# Patient Record
Sex: MALE | Race: WHITE | URBAN - METROPOLITAN AREA
[De-identification: names, ages, dates, MRNs, and addresses within clinical notes are randomized per-mention and may not be internally consistent; named-entity substitution may affect disease eponyms.]

---

## 2022-10-13 ENCOUNTER — OFFICE VISIT (OUTPATIENT)
Dept: URBAN - METROPOLITAN AREA CLINIC 37 | Facility: CLINIC | Age: 72
End: 2022-10-13
Payer: MEDICARE

## 2022-10-13 DIAGNOSIS — H43.813 VITREOUS DEGENERATION, BILATERAL: ICD-10-CM

## 2022-10-13 DIAGNOSIS — H25.813 COMBINED FORMS OF AGE-RELATED CATARACT, BILATERAL: ICD-10-CM

## 2022-10-13 DIAGNOSIS — H40.1132 PRIMARY OPEN-ANGLE GLAUCOMA, BILATERAL, MODERATE STAGE: Primary | ICD-10-CM

## 2022-10-13 DIAGNOSIS — H40.1432 BIALTERAL CAPSULAR GLAUCOMA W/ PSEUDOEXFOLIATION OF LENS, MODERATE STAGE: ICD-10-CM

## 2022-10-13 PROCEDURE — 92133 CPTRZD OPH DX IMG PST SGM ON: CPT | Performed by: OPTOMETRIST

## 2022-10-13 PROCEDURE — 99204 OFFICE O/P NEW MOD 45 MIN: CPT | Performed by: OPTOMETRIST

## 2022-10-13 ASSESSMENT — VISUAL ACUITY
OD: 20/30
OS: 20/50

## 2022-10-13 ASSESSMENT — INTRAOCULAR PRESSURE
OS: 14
OD: 14

## 2022-10-13 NOTE — IMPRESSION/PLAN
Impression: Primary open-angle glaucoma, bilateral, moderate stage: F53.3090.  Plan: cont w gtts as directed by DR Radha Gaming

## 2022-10-13 NOTE — IMPRESSION/PLAN
Impression: Combined forms of age-related cataract, bilateral: H25.813. Plan:  Discussed cataract diagnosis with the patient. Discussed and reviewed treatment options for cataracts. Surgical treatment is required for cataracts. Recommend surgery OU, OD first. 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
PT interested in [address] [place] [address] as well PT Lulú Del Rosario MD and his Dustin Erickson MD [facility] recd DR Urban Garcia

## 2022-12-28 ENCOUNTER — TESTING ONLY (OUTPATIENT)
Dept: URBAN - METROPOLITAN AREA CLINIC 10 | Facility: CLINIC | Age: 72
End: 2022-12-28
Payer: MEDICARE

## 2022-12-28 DIAGNOSIS — H25.813 COMBINED FORMS OF AGE-RELATED CATARACT, BILATERAL: Primary | ICD-10-CM

## 2022-12-28 DIAGNOSIS — Z01.818 ENCOUNTER FOR OTHER PREPROCEDURAL EXAMINATION: Primary | ICD-10-CM

## 2022-12-28 PROCEDURE — 92025 CPTRIZED CORNEAL TOPOGRAPHY: CPT | Performed by: OPHTHALMOLOGY

## 2022-12-28 PROCEDURE — 99202 OFFICE O/P NEW SF 15 MIN: CPT | Performed by: PHYSICIAN ASSISTANT

## 2022-12-29 ENCOUNTER — PRE-OPERATIVE VISIT (OUTPATIENT)
Dept: URBAN - METROPOLITAN AREA CLINIC 10 | Facility: CLINIC | Age: 72
End: 2022-12-29
Payer: MEDICARE

## 2022-12-29 DIAGNOSIS — H52.223 REGULAR ASTIGMATISM, BILATERAL: ICD-10-CM

## 2022-12-29 PROCEDURE — 99214 OFFICE O/P EST MOD 30 MIN: CPT | Performed by: OPHTHALMOLOGY

## 2022-12-29 ASSESSMENT — PACHYMETRY
OS: 24.33
OS: 3.34
OD: 3.30
OD: 24.36

## 2022-12-29 ASSESSMENT — INTRAOCULAR PRESSURE
OS: 13
OD: 13

## 2022-12-29 NOTE — IMPRESSION/PLAN
Impression: Combined forms of age-related cataract, bilateral: H25.813. Plan: Discussed cataract diagnosis with the patient. Discussed and reviewed treatment options for cataracts. Surgical treatment is required for cataracts. Risks and benefits of surgical treatment were discussed and understood. Patient elects surgical treatment. Patient understands the need for glasses post-op. Recommend surgery OU,OD  first. STD LENS DISTANCE AIM , ORA OD ONLY AFTER SURGEON DISCUSSION, PLAN LRI, REVIEWED RIC. Discussed limitations to vision post op due to POAG, PSEDUEXFOLIOATION, VIT DEGEN,  If first eye doing well, ok to proceed with second eye surgery. [facility] [address] [facility] WILL HAVE FORCED MONOVISION TRIAL BETWEEN EYES, WILL CONFIRM AIM AT 1 WEEK PO.

## 2023-01-06 ENCOUNTER — OFFICE VISIT (OUTPATIENT)
Dept: URBAN - METROPOLITAN AREA CLINIC 10 | Facility: CLINIC | Age: 73
End: 2023-01-06
Payer: MEDICARE

## 2023-01-06 DIAGNOSIS — H59.021: ICD-10-CM

## 2023-01-06 DIAGNOSIS — H27.01 APHAKIA, RIGHT EYE: ICD-10-CM

## 2023-01-06 DIAGNOSIS — H25.813 COMBINED FORMS OF AGE-RELATED CATARACT, BILATERAL: Primary | ICD-10-CM

## 2023-01-06 PROCEDURE — 92014 COMPRE OPH EXAM EST PT 1/>: CPT | Performed by: OPHTHALMOLOGY

## 2023-01-06 PROCEDURE — 92134 CPTRZ OPH DX IMG PST SGM RTA: CPT | Performed by: OPHTHALMOLOGY

## 2023-01-06 ASSESSMENT — INTRAOCULAR PRESSURE
OD: 16
OS: 12

## 2023-01-06 NOTE — IMPRESSION/PLAN
Impression: Lens fragments in eye following cataract surgery of right eye: H59.021. Plan: The patient is surgically aphakic OD,  The findings were discussed with the patient, along with the risks and benefits of a vitrectomy/ secondary sutured IOL OD/ removal of lens material and the patient will let us know if they would like to be scheduled for surgery.

## 2023-01-14 ENCOUNTER — POST-OPERATIVE VISIT (OUTPATIENT)
Dept: URBAN - METROPOLITAN AREA CLINIC 10 | Facility: CLINIC | Age: 73
End: 2023-01-14
Payer: MEDICARE

## 2023-01-14 DIAGNOSIS — Z48.810 ENCOUNTER FOR SURGICAL AFTERCARE FOLLOWING SURGERY ON A SENSE ORGAN: Primary | ICD-10-CM

## 2023-01-14 PROCEDURE — 99024 POSTOP FOLLOW-UP VISIT: CPT

## 2023-01-14 RX ORDER — OFLOXACIN 3 MG/ML
0.3 % SOLUTION/ DROPS OPHTHALMIC
Qty: 5 | Refills: 1 | Status: ACTIVE
Start: 2023-01-14

## 2023-01-14 NOTE — IMPRESSION/PLAN
Impression: S/P Posterior Vitrectomy (PPVIT)/secondary sutured IOL OD - 1 Day. Encounter for surgical aftercare following surgery on a sense organ  Z48.810. Post operative instructions reviewed - Plan: Reviewed post op gtts. Patient has large corneal abrasion superior OD. Offered bandage CL and pt declined. Increase use of Ofloxacin to Q2-3H OD x 1 week. Pt to call if pain increases or any visual changes.

## 2023-01-27 ENCOUNTER — OFFICE VISIT (OUTPATIENT)
Dept: URBAN - METROPOLITAN AREA CLINIC 10 | Facility: CLINIC | Age: 73
End: 2023-01-27

## 2023-01-27 DIAGNOSIS — H59.021 CATARACT (LENS) FRAGMENTS IN EYE FOLLOWING CATARACT SURGERY, RIGHT EYE: Primary | ICD-10-CM

## 2023-01-27 PROCEDURE — 92134 CPTRZ OPH DX IMG PST SGM RTA: CPT | Performed by: OPHTHALMOLOGY

## 2023-01-27 PROCEDURE — 99024 POSTOP FOLLOW-UP VISIT: CPT | Performed by: OPHTHALMOLOGY

## 2023-01-27 ASSESSMENT — INTRAOCULAR PRESSURE
OD: 35
OS: 18

## 2023-01-27 NOTE — IMPRESSION/PLAN
Impression: Lens fragments in eye following cataract surgery of right eye: H59.021. Plan: The patient's retina is flat and attached 360 OD. The patient will stop his predforte since his IOP is elevated (continue his glaucoma drops) and continue his Ofloxicin BID and then return to clinic in one month for a dilated follow up and possible oct.

## 2023-03-03 ENCOUNTER — OFFICE VISIT (OUTPATIENT)
Dept: URBAN - METROPOLITAN AREA CLINIC 10 | Facility: CLINIC | Age: 73
End: 2023-03-03
Payer: MEDICARE

## 2023-03-03 DIAGNOSIS — H59.021 CATARACT (LENS) FRAGMENTS IN EYE FOLLOWING CATARACT SURGERY, RIGHT EYE: Primary | ICD-10-CM

## 2023-03-03 PROCEDURE — 99214 OFFICE O/P EST MOD 30 MIN: CPT | Performed by: OPHTHALMOLOGY

## 2023-03-03 PROCEDURE — 92134 CPTRZ OPH DX IMG PST SGM RTA: CPT | Performed by: OPHTHALMOLOGY

## 2023-03-03 ASSESSMENT — INTRAOCULAR PRESSURE
OD: 14
OS: 12

## 2023-03-03 NOTE — IMPRESSION/PLAN
Chief Complaint   Patient presents with   • Laceration       HPI:  Tin presents with a laceration of the right little finger   The injury occurred today at home.   Patient reports they sustained laceration while handling a ratchet strap .  Bleeding was well controlled in urgent care.    Last tetanus booster was in 2016, up-to-date    Denies compromise of range of motion of the affected finger.  Not on any major blood thinners.  No loss of sensation to affected finger.  Is right-hand dominant.  States otherwise healthy without other concern at this time.    No current facility-administered medications for this encounter.      Current Outpatient Medications   Medication Sig   • pantoprazole (PROTONIX) 40 MG tablet TAKE 1 TABLET BY MOUTH DAILY   • lisinopril (ZESTRIL) 2.5 MG tablet Take 1 tablet by mouth daily.   • metoPROLOL tartrate (LOPRESSOR) 50 MG tablet Take 0.5 tablets by mouth 2 times daily.   • Multiple Vitamins-Minerals (ICAPS AREDS 2 PO) Take 2 capsules by mouth daily.   • ibuprofen (MOTRIN) 200 MG tablet Take 800 mg by mouth every 6 hours as needed for Pain.   • Red Yeast Rice 600 MG Tab 1,200 mg daily. Takes 1 tablet every other day   • aspirin 81 MG tablet Take 1 tablet by mouth daily.   • cholecalciferol (VITAMIN D3) 1000 UNITS tablet Take 1,000 Units by mouth daily.   • nitroGLYcerin (NITROSTAT) 0.4 MG sublingual tablet Place 1 tablet under the tongue every 5 minutes as needed for Chest pain.       Social History     Tobacco Use   Smoking Status Former Smoker   • Packs/day: 0.75   • Years: 34.00   • Pack years: 25.50   • Types: Cigarettes   • Start date: 1972   • Last attempt to quit: 2006   • Years since quittin.5   Smokeless Tobacco Former User   • Types: Chew   • Quit date: 1981       ROS:  ALL 13 SYSTEMS REVIEWED AND NEGATIVE OR NONCONTRIBUTORY UNLESS OTHERWISE NOTED IN HPI    PHYSICAL EXAMINATION:   Vitals:    20 1036   BP: (!) 149/69   Pulse: (!) 56   Resp: 18   Temp: 98.2  Impression: Lens fragments in eye following cataract surgery of right eye: H59.021. Plan: The patient's retina is flat and attached 360 OD. The patient will return to clinic in 2 months for a dilated follow up and possible oct. °F (36.8 °C)     GEN: pleasant, alert, in no acute distress, no pallor  Heart: Regular rate and rhythm, no murmurs or gallops  Lungs: Good respiratory effort, lungs CTAB  SKIN: Exam of the right little finger reveals 1.3 cm full thickness laceration that is stellate and tangential to the plane of skin.  Approximately 3 mm in depth.  Hands rather soiled but very slight contamination, cleaning extensive.  The wound  does not contain debris or foreign body.  MUSCULOSKELETAL: ROM and strength of extremity distal to injury is not affected.  I am able to minimally see any tendon movement on flexion in a bloodless field, does not appear to have compromised any tendinous structure but there is compromise of adipose tissue.  Neurovascularly intact distally.  NEUROLOGIC: sensory and motor is intact to region of injury    PROCEDURE NOTE:  Laceration Repair  Date/Time: 7/9/2020 12:00 PM  Performed by: Jordy Welsh PA-C  Authorized by: Jordy Welsh PA-C     Consent:     Consent obtained:  Verbal    Consent given by:  Patient    Risks discussed:  Infection, need for additional repair, poor cosmetic result, tendon damage, vascular damage, poor wound healing and pain  Anesthesia (see MAR for exact dosages):     Anesthesia method:  Nerve block    Block needle gauge:  27 G    Block anesthetic:  Lidocaine 1% w/o epi    Block injection procedure:  Anatomic landmarks identified, introduced needle, incremental injection and anatomic landmarks palpated    Block outcome:  Anesthesia achieved  Laceration details:     Location:  Finger    Finger location:  R small finger    Length (cm):  1.3    Depth (mm):  3  Repair type:     Repair type:  Simple  Pre-procedure details:     Preparation:  Patient was prepped and draped in usual sterile fashion  Exploration:     Wound exploration: wound explored through full range of motion      Wound extent: areolar tissue violated      Wound extent: no fascia violation noted, no foreign  bodies/material noted, no nerve damage noted and no tendon damage noted      Contaminated: yes    Treatment:     Area cleansed with:  Shur-Clens, saline and soap and water    Amount of cleaning:  Extensive    Irrigation solution:  Sterile saline  Skin repair:     Repair method:  Sutures    Suture size:  5-0    Suture material:  Nylon    Suture technique:  Simple interrupted    Number of sutures:  4  Approximation:     Approximation:  Close  Post-procedure details:     Dressing:  Antibiotic ointment, sterile dressing and splint for protection    Patient tolerance of procedure:  Tolerated well, no immediate complications          Imaging:  No orders to display       A/P:  Laceration of right little finger    Typical wound care was reviewed, keep wound covered, clean and dry.  Return to ED for suture removal in 10-12 days, or earlier if signs of infection such as increasing/spreading redness, swelling, fevers, pain.   Avoid swimming, tub bathing, aggressive washing or scrubbing to that area   Oral antibiotics were prescribed.  Cefadroxil 500 mg b.i.d. x3 days with possible extension of wound towards the tendon sheath but no obvious tendon compromise.  Tetanus otherwise up-to-date.  Aluminum splint for finger placed by myself, tolerates well.  Contact PCP or urgent care for any further concerns including signs of infection, healing issues, pain or irritation.    Closure:  The patient understands that this is a provisional diagnosis. Provisional diagnosis can and do change. The diagnosis that you are discharged with today is based on the symptoms with which you presented today. If any new symptoms occur or worsen, you should seek immediate attention for re-evaluation.  Any symptoms that persist or fail to completely resolve require further evaluation by your other healthcare provider(s).         Jordy Welsh PA-C  07/09/20 [id number]

## 2023-03-10 ENCOUNTER — OFFICE VISIT (OUTPATIENT)
Dept: URBAN - METROPOLITAN AREA CLINIC 10 | Facility: CLINIC | Age: 73
End: 2023-03-10
Payer: MEDICARE

## 2023-03-10 DIAGNOSIS — H35.351 CYSTOID MACULAR DEGENERATION, RIGHT EYE: Primary | ICD-10-CM

## 2023-03-10 PROCEDURE — 99024 POSTOP FOLLOW-UP VISIT: CPT | Performed by: OPHTHALMOLOGY

## 2023-03-10 PROCEDURE — 67515 INJECT/TREAT EYE SOCKET: CPT | Performed by: OPHTHALMOLOGY

## 2023-03-10 ASSESSMENT — INTRAOCULAR PRESSURE
OS: 11
OD: 11

## 2023-03-10 NOTE — IMPRESSION/PLAN
Impression: Cystoid macular degeneration, right eye: H35.351. Plan: The exam and oct show that the patient has CME OD, the risks and benefits of a subtenon Kenalog injection was discussed with the patient and a subtenon Kenalog injection was administered without complication OD. The patient will return to clinic  as scheduled.

## 2023-04-07 ENCOUNTER — OFFICE VISIT (OUTPATIENT)
Dept: URBAN - METROPOLITAN AREA CLINIC 10 | Facility: CLINIC | Age: 73
End: 2023-04-07
Payer: MEDICARE

## 2023-04-07 DIAGNOSIS — H35.351 CYSTOID MACULAR DEGENERATION, RIGHT EYE: Primary | ICD-10-CM

## 2023-04-07 DIAGNOSIS — H25.12 AGE-RELATED NUCLEAR CATARACT, LEFT EYE: ICD-10-CM

## 2023-04-07 PROCEDURE — 92134 CPTRZ OPH DX IMG PST SGM RTA: CPT | Performed by: OPHTHALMOLOGY

## 2023-04-07 PROCEDURE — 99024 POSTOP FOLLOW-UP VISIT: CPT | Performed by: OPHTHALMOLOGY

## 2023-04-07 ASSESSMENT — INTRAOCULAR PRESSURE
OD: 12
OS: 18

## 2023-04-07 NOTE — IMPRESSION/PLAN
Impression: Age-related nuclear cataract, left eye: H25.12. Plan: The patient will obtain a cataract evaluation OS.

## 2023-04-07 NOTE — IMPRESSION/PLAN
Impression: Cystoid macular degeneration, right eye: H35.351. Plan: The exam and oct show that the patient has CME OD which is improving. The patient  will continue his predforte TID OD. The patient will return to clinic  with Dr. Bruno Del Castillo for a cataract evaluation.

## 2023-05-05 ENCOUNTER — OFFICE VISIT (OUTPATIENT)
Dept: URBAN - METROPOLITAN AREA CLINIC 10 | Facility: CLINIC | Age: 73
End: 2023-05-05
Payer: MEDICARE

## 2023-05-05 DIAGNOSIS — H35.351 CYSTOID MACULAR DEGENERATION, RIGHT EYE: Primary | ICD-10-CM

## 2023-05-05 PROCEDURE — 92134 CPTRZ OPH DX IMG PST SGM RTA: CPT | Performed by: OPHTHALMOLOGY

## 2023-05-05 PROCEDURE — 99214 OFFICE O/P EST MOD 30 MIN: CPT | Performed by: OPHTHALMOLOGY

## 2023-05-05 ASSESSMENT — INTRAOCULAR PRESSURE
OS: 14
OD: 11

## 2023-05-05 NOTE — IMPRESSION/PLAN
Impression: Cystoid macular degeneration, right eye: H35.351. Plan: The exam and oct show that the patient has CME OD which is resolved. The patient  will taper his drops once daily then stopping once bottle is finished. The patient will return to clinic  with Dr. Juan Miguel Seals for a cataract evaluation. He will return to clinic in one month for a dilated follow up and possible oct.

## 2023-08-01 ENCOUNTER — OFFICE VISIT (OUTPATIENT)
Dept: URBAN - METROPOLITAN AREA CLINIC 10 | Facility: CLINIC | Age: 73
End: 2023-08-01
Payer: MEDICARE

## 2023-08-01 DIAGNOSIS — H59.031 CYSTOID MACULAR EDEMA FOLLOWING CATARACT SURGERY, RIGHT EYE: ICD-10-CM

## 2023-08-01 DIAGNOSIS — H35.351 CYSTOID MACULAR DEGENERATION, RIGHT EYE: Primary | ICD-10-CM

## 2023-08-01 DIAGNOSIS — H25.12 AGE-RELATED NUCLEAR CATARACT, LEFT EYE: ICD-10-CM

## 2023-08-01 PROCEDURE — 92134 CPTRZ OPH DX IMG PST SGM RTA: CPT | Performed by: OPHTHALMOLOGY

## 2023-08-01 PROCEDURE — 99214 OFFICE O/P EST MOD 30 MIN: CPT | Performed by: OPHTHALMOLOGY

## 2023-08-01 ASSESSMENT — INTRAOCULAR PRESSURE
OD: 15
OS: 11

## 2023-09-19 ENCOUNTER — OFFICE VISIT (OUTPATIENT)
Dept: URBAN - METROPOLITAN AREA CLINIC 10 | Facility: CLINIC | Age: 73
End: 2023-09-19
Payer: MEDICARE

## 2023-09-19 DIAGNOSIS — H35.351 CYSTOID MACULAR DEGENERATION, RIGHT EYE: Primary | ICD-10-CM

## 2023-09-19 DIAGNOSIS — H25.12 AGE-RELATED NUCLEAR CATARACT, LEFT EYE: ICD-10-CM

## 2023-09-19 PROCEDURE — 99214 OFFICE O/P EST MOD 30 MIN: CPT | Performed by: OPHTHALMOLOGY

## 2023-09-19 PROCEDURE — 92134 CPTRZ OPH DX IMG PST SGM RTA: CPT | Performed by: OPHTHALMOLOGY

## 2023-09-19 PROCEDURE — 92250 FUNDUS PHOTOGRAPHY W/I&R: CPT | Performed by: OPHTHALMOLOGY

## 2023-09-19 ASSESSMENT — INTRAOCULAR PRESSURE
OS: 6
OD: 6

## 2023-11-07 ENCOUNTER — OFFICE VISIT (OUTPATIENT)
Dept: URBAN - METROPOLITAN AREA CLINIC 10 | Facility: CLINIC | Age: 73
End: 2023-11-07
Payer: MEDICARE

## 2023-11-07 DIAGNOSIS — H35.351 CYSTOID MACULAR DEGENERATION, RIGHT EYE: ICD-10-CM

## 2023-11-07 DIAGNOSIS — H59.031 CYSTOID MACULAR EDEMA FOLLOWING CATARACT SURGERY, RIGHT EYE: ICD-10-CM

## 2023-11-07 DIAGNOSIS — H43.813 VITREOUS DEGENERATION, BILATERAL: Primary | ICD-10-CM

## 2023-11-07 DIAGNOSIS — H25.12 AGE-RELATED NUCLEAR CATARACT, LEFT EYE: ICD-10-CM

## 2023-11-07 PROCEDURE — 92134 CPTRZ OPH DX IMG PST SGM RTA: CPT | Performed by: OPHTHALMOLOGY

## 2023-11-07 PROCEDURE — 99214 OFFICE O/P EST MOD 30 MIN: CPT | Performed by: OPHTHALMOLOGY

## 2023-11-07 ASSESSMENT — INTRAOCULAR PRESSURE
OS: 13
OD: 15

## 2024-01-16 ENCOUNTER — OFFICE VISIT (OUTPATIENT)
Dept: URBAN - METROPOLITAN AREA CLINIC 10 | Facility: CLINIC | Age: 74
End: 2024-01-16
Payer: MEDICARE

## 2024-01-16 DIAGNOSIS — H35.351 CYSTOID MACULAR DEGENERATION, RIGHT EYE: Primary | ICD-10-CM

## 2024-01-16 PROCEDURE — 92134 CPTRZ OPH DX IMG PST SGM RTA: CPT | Performed by: OPHTHALMOLOGY

## 2024-01-16 PROCEDURE — 99214 OFFICE O/P EST MOD 30 MIN: CPT | Performed by: OPHTHALMOLOGY

## 2024-01-16 ASSESSMENT — INTRAOCULAR PRESSURE
OD: 33
OS: 24

## 2024-02-15 ENCOUNTER — OFFICE VISIT (OUTPATIENT)
Dept: URBAN - METROPOLITAN AREA CLINIC 10 | Facility: CLINIC | Age: 74
End: 2024-02-15
Payer: MEDICARE

## 2024-02-15 DIAGNOSIS — H59.031 CYSTOID MACULAR EDEMA FOLLOWING CATARACT SURGERY, RIGHT EYE: Primary | ICD-10-CM

## 2024-02-15 PROCEDURE — 99214 OFFICE O/P EST MOD 30 MIN: CPT | Performed by: OPHTHALMOLOGY

## 2024-02-15 RX ORDER — DICLOFENAC SODIUM 1 MG/ML
0.1 % SOLUTION/ DROPS OPHTHALMIC
Qty: 1 | Refills: 2 | Status: INACTIVE
Start: 2024-02-15 | End: 2024-02-15

## 2024-02-15 RX ORDER — PREDNISOLONE ACETATE 10 MG/ML
1 % SUSPENSION/ DROPS OPHTHALMIC
Qty: 5 | Refills: 3 | Status: ACTIVE
Start: 2024-02-15

## 2024-02-15 ASSESSMENT — INTRAOCULAR PRESSURE
OS: 12
OD: 12

## 2024-02-22 ENCOUNTER — OFFICE VISIT (OUTPATIENT)
Dept: URBAN - METROPOLITAN AREA CLINIC 43 | Facility: CLINIC | Age: 74
End: 2024-02-22
Payer: MEDICARE

## 2024-02-22 DIAGNOSIS — H35.351 CYSTOID MACULAR DEGENERATION, RIGHT EYE: ICD-10-CM

## 2024-02-22 DIAGNOSIS — H59.031 CYSTOID MACULAR EDEMA FOLLOWING CATARACT SURGERY, RIGHT EYE: Primary | ICD-10-CM

## 2024-02-22 PROCEDURE — 99214 OFFICE O/P EST MOD 30 MIN: CPT | Performed by: OPHTHALMOLOGY

## 2024-02-22 PROCEDURE — 92134 CPTRZ OPH DX IMG PST SGM RTA: CPT | Performed by: OPHTHALMOLOGY

## 2024-02-22 ASSESSMENT — INTRAOCULAR PRESSURE
OS: 21
OD: 20

## 2024-03-21 ENCOUNTER — OFFICE VISIT (OUTPATIENT)
Dept: URBAN - METROPOLITAN AREA CLINIC 37 | Facility: CLINIC | Age: 74
End: 2024-03-21
Payer: MEDICARE

## 2024-03-21 DIAGNOSIS — H40.1132 PRIMARY OPEN-ANGLE GLAUCOMA, BILATERAL, MODERATE STAGE: ICD-10-CM

## 2024-03-21 DIAGNOSIS — H25.12 AGE-RELATED NUCLEAR CATARACT, LEFT EYE: Primary | ICD-10-CM

## 2024-03-21 DIAGNOSIS — H35.351 CYSTOID MACULAR DEGENERATION, RIGHT EYE: ICD-10-CM

## 2024-03-21 PROCEDURE — 99213 OFFICE O/P EST LOW 20 MIN: CPT | Performed by: OPTOMETRIST

## 2024-03-21 ASSESSMENT — VISUAL ACUITY
OS: 20/50
OD: 20/25

## 2024-03-21 ASSESSMENT — INTRAOCULAR PRESSURE
OD: 12
OS: 11

## 2024-04-09 ENCOUNTER — OFFICE VISIT (OUTPATIENT)
Dept: URBAN - METROPOLITAN AREA CLINIC 10 | Facility: CLINIC | Age: 74
End: 2024-04-09
Payer: MEDICARE

## 2024-04-09 DIAGNOSIS — H59.031 CYSTOID MACULAR EDEMA FOLLOWING CATARACT SURGERY, RIGHT EYE: ICD-10-CM

## 2024-04-09 PROCEDURE — 92134 CPTRZ OPH DX IMG PST SGM RTA: CPT | Performed by: OPHTHALMOLOGY

## 2024-04-09 PROCEDURE — 99214 OFFICE O/P EST MOD 30 MIN: CPT | Performed by: OPHTHALMOLOGY

## 2024-04-09 ASSESSMENT — INTRAOCULAR PRESSURE
OD: 19
OS: 14

## 2024-04-11 ENCOUNTER — OFFICE VISIT (OUTPATIENT)
Dept: URBAN - METROPOLITAN AREA CLINIC 10 | Facility: CLINIC | Age: 74
End: 2024-04-11
Payer: MEDICARE

## 2024-04-11 DIAGNOSIS — H35.351 CYSTOID MACULAR DEGENERATION, RIGHT EYE: ICD-10-CM

## 2024-04-11 DIAGNOSIS — S05.01XA CORNEAL ABRASION OF RIGHT EYE, INITIAL ENCOUNTER: Primary | ICD-10-CM

## 2024-04-11 PROCEDURE — 99214 OFFICE O/P EST MOD 30 MIN: CPT | Performed by: STUDENT IN AN ORGANIZED HEALTH CARE EDUCATION/TRAINING PROGRAM

## 2024-04-11 RX ORDER — ERYTHROMYCIN 5 MG/G
OINTMENT OPHTHALMIC
Qty: 3.5 | Refills: 1 | Status: ACTIVE
Start: 2024-04-11

## 2024-04-16 ENCOUNTER — OFFICE VISIT (OUTPATIENT)
Dept: URBAN - METROPOLITAN AREA CLINIC 10 | Facility: CLINIC | Age: 74
End: 2024-04-16
Payer: MEDICARE

## 2024-04-16 DIAGNOSIS — S05.01XD CORNEAL ABRASION OF RIGHT EYE, SUBSEQUENT ENCOUNTER: Primary | ICD-10-CM

## 2024-04-16 DIAGNOSIS — H35.351 CYSTOID MACULAR DEGENERATION, RIGHT EYE: ICD-10-CM

## 2024-04-16 PROCEDURE — 99213 OFFICE O/P EST LOW 20 MIN: CPT | Performed by: STUDENT IN AN ORGANIZED HEALTH CARE EDUCATION/TRAINING PROGRAM

## 2024-06-18 ENCOUNTER — OFFICE VISIT (OUTPATIENT)
Dept: URBAN - METROPOLITAN AREA CLINIC 10 | Facility: CLINIC | Age: 74
End: 2024-06-18
Payer: MEDICARE

## 2024-06-18 DIAGNOSIS — H59.031 CYSTOID MACULAR EDEMA FOLLOWING CATARACT SURGERY, RIGHT EYE: ICD-10-CM

## 2024-06-18 DIAGNOSIS — H35.351 CYSTOID MACULAR DEGENERATION, RIGHT EYE: Primary | ICD-10-CM

## 2024-06-18 DIAGNOSIS — H25.12 AGE-RELATED NUCLEAR CATARACT, LEFT EYE: ICD-10-CM

## 2024-06-18 PROCEDURE — 92134 CPTRZ OPH DX IMG PST SGM RTA: CPT | Performed by: OPHTHALMOLOGY

## 2024-06-18 PROCEDURE — 99214 OFFICE O/P EST MOD 30 MIN: CPT | Performed by: OPHTHALMOLOGY

## 2024-06-18 ASSESSMENT — INTRAOCULAR PRESSURE
OD: 20
OS: 11

## 2024-08-13 ENCOUNTER — OFFICE VISIT (OUTPATIENT)
Dept: URBAN - METROPOLITAN AREA CLINIC 10 | Facility: CLINIC | Age: 74
End: 2024-08-13
Payer: MEDICARE

## 2024-08-13 DIAGNOSIS — H35.351 CYSTOID MACULAR DEGENERATION, RIGHT EYE: Primary | ICD-10-CM

## 2024-08-13 DIAGNOSIS — H25.12 AGE-RELATED NUCLEAR CATARACT, LEFT EYE: ICD-10-CM

## 2024-08-13 PROCEDURE — 92134 CPTRZ OPH DX IMG PST SGM RTA: CPT | Performed by: OPHTHALMOLOGY

## 2024-08-13 PROCEDURE — 99214 OFFICE O/P EST MOD 30 MIN: CPT | Performed by: OPHTHALMOLOGY

## 2024-08-13 ASSESSMENT — INTRAOCULAR PRESSURE
OD: 18
OS: 13

## 2024-10-08 ENCOUNTER — OFFICE VISIT (OUTPATIENT)
Dept: URBAN - METROPOLITAN AREA CLINIC 10 | Facility: CLINIC | Age: 74
End: 2024-10-08
Payer: MEDICARE

## 2024-10-08 DIAGNOSIS — H35.351 CYSTOID MACULAR DEGENERATION, RIGHT EYE: Primary | ICD-10-CM

## 2024-10-08 PROCEDURE — 99214 OFFICE O/P EST MOD 30 MIN: CPT | Performed by: OPHTHALMOLOGY

## 2024-10-08 PROCEDURE — 92134 CPTRZ OPH DX IMG PST SGM RTA: CPT | Performed by: OPHTHALMOLOGY

## 2024-10-08 ASSESSMENT — INTRAOCULAR PRESSURE
OS: 13
OD: 21

## 2025-02-11 ENCOUNTER — OFFICE VISIT (OUTPATIENT)
Dept: URBAN - METROPOLITAN AREA CLINIC 10 | Facility: CLINIC | Age: 75
End: 2025-02-11
Payer: MEDICARE

## 2025-02-11 DIAGNOSIS — H35.351 CYSTOID MACULAR DEGENERATION, RIGHT EYE: Primary | ICD-10-CM

## 2025-02-11 PROCEDURE — 99214 OFFICE O/P EST MOD 30 MIN: CPT | Performed by: OPHTHALMOLOGY

## 2025-02-11 PROCEDURE — 92134 CPTRZ OPH DX IMG PST SGM RTA: CPT | Performed by: OPHTHALMOLOGY

## 2025-02-11 ASSESSMENT — INTRAOCULAR PRESSURE
OD: 13
OS: 11

## 2025-06-24 ENCOUNTER — OFFICE VISIT (OUTPATIENT)
Dept: URBAN - METROPOLITAN AREA CLINIC 10 | Facility: CLINIC | Age: 75
End: 2025-06-24
Payer: MEDICARE

## 2025-06-24 DIAGNOSIS — H35.351 CYSTOID MACULAR DEGENERATION, RIGHT EYE: Primary | ICD-10-CM

## 2025-06-24 PROCEDURE — 99214 OFFICE O/P EST MOD 30 MIN: CPT | Performed by: OPHTHALMOLOGY

## 2025-06-24 PROCEDURE — 92134 CPTRZ OPH DX IMG PST SGM RTA: CPT | Performed by: OPHTHALMOLOGY

## 2025-06-24 ASSESSMENT — INTRAOCULAR PRESSURE
OD: 15
OS: 13